# Patient Record
Sex: MALE | Race: BLACK OR AFRICAN AMERICAN | Employment: OTHER | URBAN - METROPOLITAN AREA
[De-identification: names, ages, dates, MRNs, and addresses within clinical notes are randomized per-mention and may not be internally consistent; named-entity substitution may affect disease eponyms.]

---

## 2022-02-21 ENCOUNTER — HOSPITAL ENCOUNTER (EMERGENCY)
Age: 37
Discharge: HOME | End: 2022-02-21

## 2022-02-21 VITALS
HEART RATE: 92 BPM | RESPIRATION RATE: 16 BRPM | BODY MASS INDEX: 25.9 KG/M2 | HEIGHT: 71 IN | OXYGEN SATURATION: 98 % | SYSTOLIC BLOOD PRESSURE: 122 MMHG | TEMPERATURE: 98.7 F | WEIGHT: 185 LBS | DIASTOLIC BLOOD PRESSURE: 74 MMHG

## 2022-02-21 DIAGNOSIS — Z20.2 EXPOSURE TO STD: Primary | ICD-10-CM

## 2022-02-21 LAB
BACTERIA: ABNORMAL /HPF
BILIRUBIN URINE: NEGATIVE
BLOOD, URINE: NEGATIVE
CLARITY: CLEAR
COLOR: YELLOW
EPITHELIAL CELLS, UA: ABNORMAL /HPF
GLUCOSE URINE: NEGATIVE MG/DL
KETONES, URINE: NEGATIVE MG/DL
LEUKOCYTE ESTERASE, URINE: NEGATIVE
NITRITE, URINE: NEGATIVE
PH UA: 5.5 (ref 5–9)
PROTEIN UA: NEGATIVE MG/DL
RBC UA: ABNORMAL /HPF (ref 0–2)
SPECIFIC GRAVITY UA: 1.02 (ref 1–1.03)
UROBILINOGEN, URINE: 0.2 E.U./DL
WBC UA: ABNORMAL /HPF (ref 0–5)

## 2022-02-21 PROCEDURE — 81001 URINALYSIS AUTO W/SCOPE: CPT

## 2022-02-21 PROCEDURE — 6360000002 HC RX W HCPCS: Performed by: PHYSICIAN ASSISTANT

## 2022-02-21 PROCEDURE — 6370000000 HC RX 637 (ALT 250 FOR IP): Performed by: PHYSICIAN ASSISTANT

## 2022-02-21 PROCEDURE — 96372 THER/PROPH/DIAG INJ SC/IM: CPT

## 2022-02-21 PROCEDURE — 87491 CHLMYD TRACH DNA AMP PROBE: CPT

## 2022-02-21 PROCEDURE — 99284 EMERGENCY DEPT VISIT MOD MDM: CPT

## 2022-02-21 PROCEDURE — 87591 N.GONORRHOEAE DNA AMP PROB: CPT

## 2022-02-21 PROCEDURE — 2500000003 HC RX 250 WO HCPCS: Performed by: PHYSICIAN ASSISTANT

## 2022-02-21 RX ORDER — ONDANSETRON 4 MG/1
4 TABLET, ORALLY DISINTEGRATING ORAL ONCE
Status: COMPLETED | OUTPATIENT
Start: 2022-02-21 | End: 2022-02-21

## 2022-02-21 RX ORDER — METRONIDAZOLE 500 MG/1
2000 TABLET ORAL ONCE
Status: COMPLETED | OUTPATIENT
Start: 2022-02-21 | End: 2022-02-21

## 2022-02-21 RX ORDER — AZITHROMYCIN 250 MG/1
1000 TABLET, FILM COATED ORAL ONCE
Status: COMPLETED | OUTPATIENT
Start: 2022-02-21 | End: 2022-02-21

## 2022-02-21 RX ADMIN — ONDANSETRON 4 MG: 4 TABLET, ORALLY DISINTEGRATING ORAL at 12:28

## 2022-02-21 RX ADMIN — LIDOCAINE HYDROCHLORIDE 500 MG: 10 INJECTION, SOLUTION EPIDURAL; INFILTRATION; INTRACAUDAL; PERINEURAL at 12:28

## 2022-02-21 RX ADMIN — METRONIDAZOLE 2000 MG: 500 TABLET ORAL at 12:27

## 2022-02-21 RX ADMIN — AZITHROMYCIN 1000 MG: 250 TABLET, FILM COATED ORAL at 12:27

## 2022-02-21 NOTE — ED PROVIDER NOTES
[facility]  Department of Emergency Medicine   ED  Encounter Note  Admit Date/RoomTime: 2022 11:45 AM  ED Room: [place] [address]    NAME: Stacey Ram  : 1985  MRN: [id number]     Chief Complaint:  Exposure to STD (Partner has trich and would like treated. )    History of Present Illness      Stacey Ram is a 29 y.o. old male who presents to the emergency department by private vehicle, for known exposure to Trichomonas with no present symptoms. Patient states he got a call from his girlfriend who is currently pregnant and had gone to her gynecologist and found that she had trichomonas. Patient is here for full treatment. Patient states has not had an STD before. Patient states that he has no pain or radiation of any pain. Since exposure/onset there has been no developing symptoms. He denies any genital discharge, genital rash, genital ulcers, scrotal mass, scrotal pain, fever, abdominal pain, back pain, dysuria, urinary frequency or hematuria. His prior STD history: No prior history of sexually transmitted disease. ROS   Pertinent positives and negatives are stated within HPI, all other systems reviewed and are negative. Past Medical History:  has no past medical history on file. Surgical History:  has no past surgical history on file. Social History:  reports that he has never smoked. He has never used smokeless tobacco. He reports current alcohol use of about 1.0 standard drink of alcohol per week. He reports previous drug use. Family History: family history is not on file. Normal  Allergies: Patient has no known allergies. Physical Exam   Oxygen Saturation Interpretation: . ED Triage Vitals [22 1142]   BP Temp Temp Source Pulse Resp SpO2 Height Weight   -- 98.7 °F (37.1 °C) Oral 92 16 98 % 5' 11\" (1.803 m) 185 lb (83.9 kg)         · Constitutional:  Alert, development consistent with age. · HEENT:  NC/NT.   Airway patent  · Neck:  Normal ROM. Supple. · Respiratory:  Lungs Clear to auscultation and breath sounds equal.  · CV:  Regular rate and rhythm, normal heart sounds, without pathological murmurs, ectopy, gallops, or rubs. · GI:  AbdomenSoft, nontender, good bowel sounds. No firm or pulsatile mass. · : Genital / Rectal examination deferred or declined by patient  · Integument:  Normal turgor. Warm, dry, without visible rash, unless noted elsewhere.  Lymphatics: No lymphangitis or adenopathy noted. · Neurological:  Orientation age-appropriate. Motor functions intact. Lab / Imaging Results   (All laboratory and radiology results have been personally reviewed by myself)  Labs:  Results for orders placed or performed during the hospital encounter of 02/21/22   C.trachomatis N.gonorrhoeae DNA, Urine    Specimen: Urine   Result Value Ref Range    Source Urine    Urinalysis with Microscopic   Result Value Ref Range    Color, UA Yellow Straw/Yellow    Clarity, UA Clear Clear    Glucose, Ur Negative Negative mg/dL    Bilirubin Urine Negative Negative    Ketones, Urine Negative Negative mg/dL    Specific Gravity, UA 1.020 1.005 - 1.030    Blood, Urine Negative Negative    pH, UA 5.5 5.0 - 9.0    Protein, UA Negative Negative mg/dL    Urobilinogen, Urine 0.2 <2.0 E.U./dL    Nitrite, Urine Negative Negative    Leukocyte Esterase, Urine Negative Negative    WBC, UA NONE 0 - 5 /HPF    RBC, UA 0-1 0 - 2 /HPF    Epithelial Cells, UA RARE /HPF    Bacteria, UA RARE (A) None Seen /HPF     Imaging: All Radiology results interpreted by Radiologist unless otherwise noted.   No orders to display     ED Course / Medical Decision Making     Medications   cefTRIAXone (ROCEPHIN) 500 mg in lidocaine 1 % 1.43 mL IM Injection (500 mg IntraMUSCular Given 2/21/22 1228)   azithromycin (ZITHROMAX) tablet 1,000 mg (1,000 mg Oral Given 2/21/22 1227)   metroNIDAZOLE (FLAGYL) tablet 2,000 mg (2,000 mg Oral Given 2/21/22 1227)   ondansetron (ZOFRAN-ODT) disintegrating tablet 4 mg (4 mg Oral Given 2/21/22 9904)        Consult(s):   None    Procedure(s):   none    Medical Decision Making:    Patient is a 64-year-old that presented to the emergency department for known exposure to trichomonas. Patient has no symptoms at this time. No pain or radiation of any pain. Nothing seems to make it better or worse. Patient declined examination today. Patient had a urinalysis which revealed no evidence of UTI. Patient treated prophylactically for all STDs including gonorrhea, chlamydia and trichomonas. Patient was advised to have no intercourse for 2 weeks. Plan to obtain cultures and treat for suspected STD and provide outpatient follow-up instructions. Patient was advised that his partner must be treated. Patient was advised to get a test of cure in 1 month. Patient was advised of safer sex practices. Patient was educated if he develops any urinary symptoms to return back to emergency department. He voiced understanding and agreed. Patient was explicitly instructed on specific signs and symptoms on which to return to the emergency room for. Patient was instructed to return to the ER for any new or worsening symptoms. Additional discharge instructions were given verbally. All questions were answered. Patient is comfortable and agreeable with discharge plan. Patient in no acute distress and non-toxic in appearance. Plan of Care/Counseling:  Ruben Muñiz PA-C reviewed today's visit with the patient in addition to providing specific details for the plan of care and counseling regarding the diagnosis and prognosis. Questions are answered at this time and are agreeable with the plan. Assessment     1. Exposure to STD      Plan   Discharged home. Patient condition is stable    New Medications     There are no discharge medications for this patient.     Electronically signed by Ruben Muñiz PA-C   DD: 2/21/22  **This report was transcribed using voice recognition software. Every effort was made to ensure accuracy; however, inadvertent computerized transcription errors may be present.   END OF ED PROVIDER NOTE        David Bailey PA-C  02/21/22 3731

## 2022-02-24 LAB
C. TRACHOMATIS DNA ,URINE: NEGATIVE
N. GONORRHOEAE DNA, URINE: NEGATIVE
SOURCE: NORMAL

## 2022-03-16 ENCOUNTER — HOSPITAL ENCOUNTER (EMERGENCY)
Age: 37
Discharge: HOME | End: 2022-03-16
Payer: COMMERCIAL

## 2022-03-16 ENCOUNTER — APPOINTMENT (OUTPATIENT)
Dept: CT IMAGING | Age: 37
End: 2022-03-16
Payer: COMMERCIAL

## 2022-03-16 VITALS
SYSTOLIC BLOOD PRESSURE: 164 MMHG | HEART RATE: 85 BPM | HEIGHT: 71 IN | RESPIRATION RATE: 16 BRPM | TEMPERATURE: 97.8 F | BODY MASS INDEX: 25.9 KG/M2 | DIASTOLIC BLOOD PRESSURE: 106 MMHG | WEIGHT: 185 LBS | OXYGEN SATURATION: 96 %

## 2022-03-16 DIAGNOSIS — S09.90XA CLOSED HEAD INJURY, INITIAL ENCOUNTER: Primary | ICD-10-CM

## 2022-03-16 DIAGNOSIS — M54.2 CERVICAL PAIN: ICD-10-CM

## 2022-03-16 PROCEDURE — 72125 CT NECK SPINE W/O DYE: CPT

## 2022-03-16 PROCEDURE — 99283 EMERGENCY DEPT VISIT LOW MDM: CPT

## 2022-03-16 PROCEDURE — 70450 CT HEAD/BRAIN W/O DYE: CPT

## 2022-03-16 PROCEDURE — 6370000000 HC RX 637 (ALT 250 FOR IP): Performed by: PHYSICIAN ASSISTANT

## 2022-03-16 RX ORDER — ACETAMINOPHEN 500 MG
1000 TABLET ORAL ONCE
Status: COMPLETED | OUTPATIENT
Start: 2022-03-16 | End: 2022-03-16

## 2022-03-16 RX ORDER — NAPROXEN 500 MG/1
500 TABLET ORAL 2 TIMES DAILY PRN
Qty: 14 TABLET | Refills: 0 | Status: SHIPPED | OUTPATIENT
Start: 2022-03-16 | End: 2022-03-23

## 2022-03-16 RX ORDER — ACETAMINOPHEN 500 MG
1000 TABLET ORAL 3 TIMES DAILY PRN
Qty: 42 TABLET | Refills: 0 | Status: SHIPPED | OUTPATIENT
Start: 2022-03-16 | End: 2022-03-23

## 2022-03-16 RX ADMIN — ACETAMINOPHEN 1000 MG: 500 TABLET ORAL at 11:52

## 2022-03-16 ASSESSMENT — PAIN SCALES - GENERAL: PAINLEVEL_OUTOF10: 8

## 2022-03-16 NOTE — ED PROVIDER NOTES
[facility]       Department of Emergency Medicine   ED  Encounter Note  Admit Date/RoomTime: 3/16/2022 11:02 AM  ED Room: 35/35    NAME: Bryan Pascual  : 1985  MRN: [id number]     Chief Complaint:  Fall (on thursday- workers comp ) and Head Injury    History of Present Illness       Bryan Pascual is a 29 y.o. old male who presents to the emergency department by private vehicle, for a mechanical fall which occured 1 week(s) prior to arrival. He reportedly \"cleaning up\" at work when he got a hose caught around his leg/ankle causing him to fall backwards and strike his head on the ground prior to incident with complaints of headache and neck pain with radiation of pain down his bilateral arms. He had no pain for 2 days, nereida has been experiencing progressive worsening of his pain over the last 4 days. His pain is aggraveated by any movement or plalpation and relieved by nothing. He denies any loss of consciousness, confusion, dizziness, chest pain, abdominal pain, back pain, extremity injury, numbness, weakness, blurred vision, nausea, vomiting, fever, chills, wounds or rash. He takes no blood thinning agents. ROS   Pertinent positives and negatives are stated within HPI, all other systems reviewed and are negative. Past Medical History:  has no past medical history on file. Surgical History:  has no past surgical history on file. Social History:  reports that he has never smoked. He has never used smokeless tobacco. He reports current alcohol use of about 1.0 standard drink of alcohol per week. He reports previous drug use. Family History: family history is not on file. Allergies: Patient has no known allergies.     Physical Exam   Oxygen Saturation Interpretation: Normal.        ED Triage Vitals [22 1016]   BP Temp Temp src Pulse Resp SpO2 Height Weight   (!) 164/106 97.8 °F (36.6 °C) -- 85 16 96 % 5' 11\" (1.803 m) 185 lb (83.9 kg)       Physical Exam  Constitutional:  Alert, development consistent with age. HEENT:  NC/NT. Airway patent. Neck: Diffuse tenderness. No specific vertebral or intervertebral tenderness that is out of proportion to the rest of exam.  No crepitus or step-off. Normal ROM. Supple. Chest:  Symmetrical without visible rash or tenderness. Respiratory:  Lungs Clear to auscultation and breath sounds equal.  CV:  Regular rate and rhythm, normal heart sounds, without pathological murmurs, ectopy, gallops, or rubs. Back:  No midline or paravertebral tenderness. No costovertebral tenderness. Extremities: No tenderness or edema noted. Integument:  Normal turgor. Warm, dry, without visible rash, unless noted elsewhere. Lymphatic: no lymphadenopathy noted  Neurological:  Oriented x3, GCS 15. Motor functions intact. Lab / Imaging Results   (All laboratory and radiology results have been personally reviewed by myself)  Labs:  No results found for this visit on 03/16/22. Imaging: All Radiology results interpreted by Radiologist unless otherwise noted. CT CERVICAL SPINE WO CONTRAST   Final Result   No acute fracture or subluxation. CT HEAD WO CONTRAST   Final Result   No acute intracranial abnormality. ED Course / Medical Decision Making     Medications   acetaminophen (TYLENOL) tablet 1,000 mg (1,000 mg Oral Given 3/16/22 1152)        Re-examination:  3/16/22     Time: 1145  Patients symptoms show no change. Nurse at bedside giving meds. Results discussed. Consult(s):   None    Procedure(s):  None    MDM:   Patient presents to the emergency department for a fall with neck pain. CT head and neck was obtained and confirm the above findings. Patient well-appearing appropriate discharge and outpatient follow-up with corporate care. No focal neuro deficit. Naproxen and Tylenol as needed for pain. Worker's Compensation paperwork filled out without restrictions. Return for any new worsening symptoms.     Plan of Care/Counseling: Brook Woodward PA-C reviewed today's visit with the patient in addition to providing specific details for the plan of care and counseling regarding the diagnosis and prognosis. Questions are answered at this time and are agreeable with the plan. Assessment      1. Closed head injury, initial encounter    2. Cervical pain      Plan   Discharged home. Patient condition is good    New Medications     New Prescriptions    ACETAMINOPHEN (TYLENOL) 500 MG TABLET    Take 2 tablets by mouth 3 times daily as needed for Pain or Fever    NAPROXEN (NAPROSYN) 500 MG TABLET    Take 1 tablet by mouth 2 times daily as needed for Pain     Electronically signed by Brook Woodward PA-C   DD: 3/16/22  **This report was transcribed using voice recognition software. Every effort was made to ensure accuracy; however, inadvertent computerized transcription errors may be present.   END OF ED PROVIDER NOTE        Brook Woodward PA-C  03/16/22 8611

## 2022-09-04 ENCOUNTER — HOSPITAL ENCOUNTER (EMERGENCY)
Age: 37
Discharge: HOME | End: 2022-09-04
Attending: EMERGENCY MEDICINE
Payer: MEDICAID

## 2022-09-04 ENCOUNTER — APPOINTMENT (OUTPATIENT)
Dept: GENERAL RADIOLOGY | Age: 37
End: 2022-09-04
Payer: MEDICAID

## 2022-09-04 VITALS
WEIGHT: 170 LBS | HEIGHT: 71 IN | OXYGEN SATURATION: 99 % | HEART RATE: 82 BPM | RESPIRATION RATE: 18 BRPM | BODY MASS INDEX: 23.8 KG/M2 | TEMPERATURE: 98.3 F | DIASTOLIC BLOOD PRESSURE: 116 MMHG | SYSTOLIC BLOOD PRESSURE: 169 MMHG

## 2022-09-04 VITALS
SYSTOLIC BLOOD PRESSURE: 143 MMHG | RESPIRATION RATE: 16 BRPM | HEIGHT: 71 IN | TEMPERATURE: 97.3 F | WEIGHT: 170 LBS | DIASTOLIC BLOOD PRESSURE: 115 MMHG | BODY MASS INDEX: 23.8 KG/M2 | OXYGEN SATURATION: 100 % | HEART RATE: 87 BPM

## 2022-09-04 DIAGNOSIS — S62.639A CLOSED FRACTURE OF TUFT OF DISTAL PHALANX OF FINGER: ICD-10-CM

## 2022-09-04 DIAGNOSIS — L08.9 FINGER INFECTION: Primary | ICD-10-CM

## 2022-09-04 DIAGNOSIS — L03.011 CELLULITIS OF FINGER OF RIGHT HAND: ICD-10-CM

## 2022-09-04 LAB
ALBUMIN SERPL-MCNC: 4.7 G/DL (ref 3.5–5.2)
ALP BLD-CCNC: 57 U/L (ref 40–129)
ALT SERPL-CCNC: 24 U/L (ref 0–40)
ANION GAP SERPL CALCULATED.3IONS-SCNC: 10 MMOL/L (ref 7–16)
AST SERPL-CCNC: 44 U/L (ref 0–39)
BASOPHILS ABSOLUTE: 0.01 E9/L (ref 0–0.2)
BASOPHILS RELATIVE PERCENT: 0.1 % (ref 0–2)
BILIRUB SERPL-MCNC: 0.4 MG/DL (ref 0–1.2)
BUN BLDV-MCNC: 8 MG/DL (ref 6–20)
C-REACTIVE PROTEIN: 0.3 MG/DL (ref 0–0.4)
CALCIUM SERPL-MCNC: 9.7 MG/DL (ref 8.6–10.2)
CHLORIDE BLD-SCNC: 101 MMOL/L (ref 98–107)
CO2: 25 MMOL/L (ref 22–29)
CREAT SERPL-MCNC: 0.9 MG/DL (ref 0.7–1.2)
EOSINOPHILS ABSOLUTE: 0.05 E9/L (ref 0.05–0.5)
EOSINOPHILS RELATIVE PERCENT: 0.6 % (ref 0–6)
GFR AFRICAN AMERICAN: >60
GFR NON-AFRICAN AMERICAN: >60 ML/MIN/1.73
GLUCOSE BLD-MCNC: 108 MG/DL (ref 74–99)
HCT VFR BLD CALC: 40.4 % (ref 37–54)
HEMOGLOBIN: 13.7 G/DL (ref 12.5–16.5)
IMMATURE GRANULOCYTES #: 0.03 E9/L
IMMATURE GRANULOCYTES %: 0.4 % (ref 0–5)
LACTIC ACID: 1 MMOL/L (ref 0.5–2.2)
LYMPHOCYTES ABSOLUTE: 2.66 E9/L (ref 1.5–4)
LYMPHOCYTES RELATIVE PERCENT: 32.1 % (ref 20–42)
MCH RBC QN AUTO: 35.2 PG (ref 26–35)
MCHC RBC AUTO-ENTMCNC: 33.9 % (ref 32–34.5)
MCV RBC AUTO: 103.9 FL (ref 80–99.9)
MONOCYTES ABSOLUTE: 0.99 E9/L (ref 0.1–0.95)
MONOCYTES RELATIVE PERCENT: 11.9 % (ref 2–12)
NEUTROPHILS ABSOLUTE: 4.55 E9/L (ref 1.8–7.3)
NEUTROPHILS RELATIVE PERCENT: 54.9 % (ref 43–80)
PDW BLD-RTO: 13.9 FL (ref 11.5–15)
PLATELET # BLD: 245 E9/L (ref 130–450)
PMV BLD AUTO: 8.9 FL (ref 7–12)
POTASSIUM SERPL-SCNC: 3.4 MMOL/L (ref 3.5–5)
RBC # BLD: 3.89 E12/L (ref 3.8–5.8)
SEDIMENTATION RATE, ERYTHROCYTE: 14 MM/HR (ref 0–15)
SODIUM BLD-SCNC: 136 MMOL/L (ref 132–146)
TOTAL PROTEIN: 8.1 G/DL (ref 6.4–8.3)
WBC # BLD: 8.3 E9/L (ref 4.5–11.5)

## 2022-09-04 PROCEDURE — 96365 THER/PROPH/DIAG IV INF INIT: CPT

## 2022-09-04 PROCEDURE — 99283 EMERGENCY DEPT VISIT LOW MDM: CPT

## 2022-09-04 PROCEDURE — 90471 IMMUNIZATION ADMIN: CPT | Performed by: PHYSICIAN ASSISTANT

## 2022-09-04 PROCEDURE — 6370000000 HC RX 637 (ALT 250 FOR IP): Performed by: STUDENT IN AN ORGANIZED HEALTH CARE EDUCATION/TRAINING PROGRAM

## 2022-09-04 PROCEDURE — 6360000002 HC RX W HCPCS: Performed by: EMERGENCY MEDICINE

## 2022-09-04 PROCEDURE — 87040 BLOOD CULTURE FOR BACTERIA: CPT

## 2022-09-04 PROCEDURE — 83605 ASSAY OF LACTIC ACID: CPT

## 2022-09-04 PROCEDURE — 36415 COLL VENOUS BLD VENIPUNCTURE: CPT

## 2022-09-04 PROCEDURE — 85025 COMPLETE CBC W/AUTO DIFF WBC: CPT

## 2022-09-04 PROCEDURE — 90714 TD VACC NO PRESV 7 YRS+ IM: CPT | Performed by: PHYSICIAN ASSISTANT

## 2022-09-04 PROCEDURE — 73130 X-RAY EXAM OF HAND: CPT

## 2022-09-04 PROCEDURE — 85651 RBC SED RATE NONAUTOMATED: CPT

## 2022-09-04 PROCEDURE — 86140 C-REACTIVE PROTEIN: CPT

## 2022-09-04 PROCEDURE — 6360000002 HC RX W HCPCS: Performed by: PHYSICIAN ASSISTANT

## 2022-09-04 PROCEDURE — 87077 CULTURE AEROBIC IDENTIFY: CPT

## 2022-09-04 PROCEDURE — 87075 CULTR BACTERIA EXCEPT BLOOD: CPT

## 2022-09-04 PROCEDURE — 2580000003 HC RX 258: Performed by: EMERGENCY MEDICINE

## 2022-09-04 PROCEDURE — 2580000003 HC RX 258: Performed by: PHYSICIAN ASSISTANT

## 2022-09-04 PROCEDURE — 87205 SMEAR GRAM STAIN: CPT

## 2022-09-04 PROCEDURE — 87186 SC STD MICRODIL/AGAR DIL: CPT

## 2022-09-04 PROCEDURE — 99284 EMERGENCY DEPT VISIT MOD MDM: CPT

## 2022-09-04 PROCEDURE — 80053 COMPREHEN METABOLIC PANEL: CPT

## 2022-09-04 PROCEDURE — 96375 TX/PRO/DX INJ NEW DRUG ADDON: CPT

## 2022-09-04 PROCEDURE — 87070 CULTURE OTHR SPECIMN AEROBIC: CPT

## 2022-09-04 RX ORDER — OXYCODONE HYDROCHLORIDE AND ACETAMINOPHEN 5; 325 MG/1; MG/1
1 TABLET ORAL ONCE
Status: DISCONTINUED | OUTPATIENT
Start: 2022-09-04 | End: 2022-09-04

## 2022-09-04 RX ORDER — OXYCODONE HYDROCHLORIDE AND ACETAMINOPHEN 5; 325 MG/1; MG/1
1 TABLET ORAL ONCE
Status: COMPLETED | OUTPATIENT
Start: 2022-09-04 | End: 2022-09-04

## 2022-09-04 RX ORDER — SULFAMETHOXAZOLE AND TRIMETHOPRIM 800; 160 MG/1; MG/1
1 TABLET ORAL 2 TIMES DAILY
Qty: 20 TABLET | Refills: 0 | Status: SHIPPED | OUTPATIENT
Start: 2022-09-04 | End: 2022-09-14

## 2022-09-04 RX ORDER — MORPHINE SULFATE 4 MG/ML
4 INJECTION, SOLUTION INTRAMUSCULAR; INTRAVENOUS ONCE
Status: DISCONTINUED | OUTPATIENT
Start: 2022-09-04 | End: 2022-09-04

## 2022-09-04 RX ORDER — ONDANSETRON 2 MG/ML
4 INJECTION INTRAMUSCULAR; INTRAVENOUS ONCE
Status: COMPLETED | OUTPATIENT
Start: 2022-09-04 | End: 2022-09-04

## 2022-09-04 RX ORDER — TETANUS AND DIPHTHERIA TOXOIDS ADSORBED 2; 2 [LF]/.5ML; [LF]/.5ML
0.5 INJECTION INTRAMUSCULAR ONCE
Status: COMPLETED | OUTPATIENT
Start: 2022-09-04 | End: 2022-09-04

## 2022-09-04 RX ORDER — AMOXICILLIN AND CLAVULANATE POTASSIUM 875; 125 MG/1; MG/1
1 TABLET, FILM COATED ORAL 2 TIMES DAILY
Qty: 20 TABLET | Refills: 0 | Status: SHIPPED | OUTPATIENT
Start: 2022-09-04 | End: 2022-09-14

## 2022-09-04 RX ADMIN — OXYCODONE AND ACETAMINOPHEN 1 TABLET: 5; 325 TABLET ORAL at 04:24

## 2022-09-04 RX ADMIN — TETANUS AND DIPHTHERIA TOXOIDS ADSORBED 0.5 ML: 2; 2 INJECTION INTRAMUSCULAR at 02:15

## 2022-09-04 RX ADMIN — ONDANSETRON 4 MG: 2 INJECTION INTRAMUSCULAR; INTRAVENOUS at 02:18

## 2022-09-04 RX ADMIN — VANCOMYCIN HYDROCHLORIDE 1500 MG: 10 INJECTION, POWDER, LYOPHILIZED, FOR SOLUTION INTRAVENOUS at 02:14

## 2022-09-04 RX ADMIN — CEFTRIAXONE 2000 MG: 2 INJECTION, POWDER, FOR SOLUTION INTRAMUSCULAR; INTRAVENOUS at 02:11

## 2022-09-04 ASSESSMENT — PAIN DESCRIPTION - PAIN TYPE
TYPE: ACUTE PAIN
TYPE: ACUTE PAIN

## 2022-09-04 ASSESSMENT — ENCOUNTER SYMPTOMS
BACK PAIN: 0
SORE THROAT: 0
EYE DISCHARGE: 0
VOMITING: 0
SINUS PRESSURE: 0
EYE REDNESS: 0
DIARRHEA: 0
COUGH: 0
EYE PAIN: 0
ABDOMINAL PAIN: 0
SHORTNESS OF BREATH: 0
WHEEZING: 0
NAUSEA: 0

## 2022-09-04 ASSESSMENT — PAIN - FUNCTIONAL ASSESSMENT
PAIN_FUNCTIONAL_ASSESSMENT: 0-10
PAIN_FUNCTIONAL_ASSESSMENT: 0-10

## 2022-09-04 ASSESSMENT — PAIN SCALES - GENERAL
PAINLEVEL_OUTOF10: 10
PAINLEVEL_OUTOF10: 4

## 2022-09-04 ASSESSMENT — PAIN DESCRIPTION - FREQUENCY
FREQUENCY: CONTINUOUS
FREQUENCY: INTERMITTENT

## 2022-09-04 ASSESSMENT — PAIN DESCRIPTION - ORIENTATION
ORIENTATION: RIGHT
ORIENTATION: RIGHT

## 2022-09-04 ASSESSMENT — PAIN DESCRIPTION - ONSET: ONSET: ON-GOING

## 2022-09-04 ASSESSMENT — PAIN DESCRIPTION - LOCATION
LOCATION: FINGER (COMMENT WHICH ONE)
LOCATION: HAND

## 2022-09-04 ASSESSMENT — PAIN DESCRIPTION - DESCRIPTORS: DESCRIPTORS: SHARP;THROBBING

## 2022-09-04 NOTE — ED NOTES
The following labs labeled with pt sticker and tubed to lab:     [] Blue     [x] Lavender   [] on ice  [x] Green/yellow  [] Green/black [] on ice  [] Yellow  [] Red  [] Pink      [] COVID-19 swab    [] Rapid  [] PCR  [] Flu Swab  [] Strep Swab  [] Peds Viral Panel     [] Urine Sample  [] Pelvic Cultures  [x] Blood Cultures   [] Wound Cultures          Nghia Ayoub RN  09/04/22 [id number]

## 2022-09-04 NOTE — ED TRIAGE NOTES
Transfer from [address] [place] [address] injured his RT hand 2 and a half weeks ago-4 days ago started to become red and painful. \"

## 2022-09-04 NOTE — ED PROVIDER NOTES
ED Attending shared visit  CC: No     [facility]  Department of Emergency Medicine   ED  Encounter Note  Admit Date/RoomTime: 2022 12:32 AM  ED Room:     NAME: Bret Escamilla  : 1985  MRN: [id number]     Chief Complaint:  Finger Pain (Right pinky pain/swelling for about a week and half now after moving a refrigerator )    History of Present Illness        Bret Escamilla is a 29 y.o. old male presenting to the emergency department by private vehicle, for traumatic Right Ring Finger and Small Finger pain which occured 1 week(s) prior to arrival.  The complaint is due to a crush injury while \"moving a fridge. \"  Patient states that he was helping move a fridge and it slipped crushing his right hand and last 2 fingers. Patient states this occurred 1 week ago. Patient states he had a small cut on the pad of his pinky that he wrapped up. Patient states over the week it started to get worse now the infection has moved down to the base of his finger. Patient states he has a hard time \"straightening his finger. \"  Patient has now radiation of pain into the palm of his hand. .  Since onset the symptoms have been worsening. Patient has no prior history of pain/injury with regards to today's visit. His pain is aggraveated by any movement and relieved by nothing. He is right handed. Tetanus Status: unknown. ROS   Pertinent positives and negatives are stated within HPI, all other systems reviewed and are negative. Past Medical History:  has no past medical history on file. Surgical History:  has no past surgical history on file. Social History:  reports that he has never smoked. He has never used smokeless tobacco. He reports current alcohol use of about 1.0 standard drink per week. He reports that he does not currently use drugs. Family History: family history is not on file. Allergies: Patient has no known allergies.     Physical Exam   Oxygen Saturation 13.9 11.5 - 15.0 fL    Platelets 246 650 - 495 E9/L    MPV 8.9 7.0 - 12.0 fL    Neutrophils % 54.9 43.0 - 80.0 %    Immature Granulocytes % 0.4 0.0 - 5.0 %    Lymphocytes % 32.1 20.0 - 42.0 %    Monocytes % 11.9 2.0 - 12.0 %    Eosinophils % 0.6 0.0 - 6.0 %    Basophils % 0.1 0.0 - 2.0 %    Neutrophils Absolute 4.55 1.80 - 7.30 E9/L    Immature Granulocytes # 0.03 E9/L    Lymphocytes Absolute 2.66 1.50 - 4.00 E9/L    Monocytes Absolute 0.99 (H) 0.10 - 0.95 E9/L    Eosinophils Absolute 0.05 0.05 - 0.50 E9/L    Basophils Absolute 0.01 0.00 - 0.20 E9/L   Comprehensive Metabolic Panel   Result Value Ref Range    Sodium 136 132 - 146 mmol/L    Potassium 3.4 (L) 3.5 - 5.0 mmol/L    Chloride 101 98 - 107 mmol/L    CO2 25 22 - 29 mmol/L    Anion Gap 10 7 - 16 mmol/L    Glucose 108 (H) 74 - 99 mg/dL    BUN 8 6 - 20 mg/dL    Creatinine 0.9 0.7 - 1.2 mg/dL    GFR Non-African American >60 >=60 mL/min/1.73    GFR African American >60     Calcium 9.7 8.6 - 10.2 mg/dL    Total Protein 8.1 6.4 - 8.3 g/dL    Albumin 4.7 3.5 - 5.2 g/dL    Total Bilirubin 0.4 0.0 - 1.2 mg/dL    Alkaline Phosphatase 57 40 - 129 U/L    ALT 24 0 - 40 U/L    AST 44 (H) 0 - 39 U/L   Lactic Acid   Result Value Ref Range    Lactic Acid 1.0 0.5 - 2.2 mmol/L       Imaging: All Radiology results interpreted by Radiologist unless otherwise noted. XR HAND RIGHT (MIN 3 VIEWS)   Final Result   Fracture of the ring finger distal tuft.              ED Course / Medical Decision Making     Medications   cefTRIAXone (ROCEPHIN) 2,000 mg in sterile water 20 mL IV syringe (2,000 mg IntraVENous Given 9/4/22 0211)   vancomycin 1500 mg in dextrose 5% 300 mL IVPB (0 mg IntraVENous Stopped 9/4/22 0234)   diptheria-tetanus toxoids (DECAVAC) 2-2 LF/0.5ML injection 0.5 mL (0.5 mLs IntraMUSCular Given 9/4/22 0215)   ondansetron (ZOFRAN) injection 4 mg (4 mg IntraVENous Given 9/4/22 0218)     ED Course as of 09/04/22 0242   Sun Sep 04, 2022   0144 Spoke to orthopedics and he asked that the patient be transferred ED to ED due to concern for flexor tenosynovitis [AM]   0145 Spoke to ED attending [facility] who accepted the transfer [AM]      ED Course User Index  [AM] Chuck Mcardle, PA-C     Consult(s):   IP CONSULT TO [address]    Procedure(s):  Finger splint placed on fourth phalanx due to tuft fracture    MDM:      Patient is a 61-year-old male that is presenting after a finger/hand injury 1 week ago. Patient a crushing injury with a refrigerator. Patient had examination and fifth entire phalanx is noted to be in the flexed position with circumferential swelling, warmth and pain radiating into the dorsal aspect of the hand. Concern for flexor tenosynovitis. Pain also noted on the fourth distal digit. Lab work, cultures, fluids obtained. Imaging was obtained based on moderate suspicion for fracture / bony abnormality, dislocation, retained foreign body, joint effusion as per history/physical findings. Patient was noted to have a fourth tuft fracture which was placed in a finger splint. Blood work and cultures obtained. Patient was seen and evaluated by my attending. Concern for flexor tenosynovitis at this time orthopedics was paged. Spoke to orthopedic on-call who asked that the patient be transferred ED to ED to be evaluated for possible washout. Consult placed for ED to ED transfer. Spoke to ED attending who accepted the transfer to [facility]. Access center spoken to and transport initiated. After some time Access center states it will be 4 to 6 hours for transfer. Patient would like to go by private vehicle. IV line pulled patient did receive Rocephin, vancomycin was started then stopped. Please see nurse report for how much was given. Patient was told to go straight to the emergency department to be admitted and seen by orthopedics Dr. Heidi Elizabeth who will evaluate the patient to determine if patient needs to go to the OR.        Plan of Care/Counseling: Gracie Ellison PA-C reviewed today's visit with the patient in addition to providing specific details for the plan of care and counseling regarding the diagnosis and prognosis. Questions are answered at this time and are agreeable with the plan. Assessment      1. Finger infection    2. Cellulitis of finger of right hand    3. Closed fracture of tuft of distal phalanx of finger      Plan   Transferred to [address] [place] [address] Emergency Department (ER-to-ER). Patient condition is stable    New Medications     New Prescriptions    No medications on file     Electronically signed by Gracie Ellison PA-C   DD: 9/4/22  **This report was transcribed using voice recognition software. Every effort was made to ensure accuracy; however, inadvertent computerized transcription errors may be present.   END OF ED PROVIDER NOTE       Gracie Ellison PA-C  09/04/22 [id number]

## 2022-09-04 NOTE — ED PROVIDER NOTES
[facility]  Department of Emergency Medicine     Written by: Jerel Hamilton DO  Patient Name: Jess Magana Date: 2022  3:39 AM  MRN: [id number]                   : 1985        Chief Complaint   Patient presents with    Hand Injury     RT hand injury transfer from [facility]    - Chief complaint    Patient is a 60-year-old male no significant past medical history. Patient presents with chief complaint of right little finger pain. Patient states that 3 weeks ago he was helping move a refrigerator when he lost his  and suffered a small laceration to his right little finger. Patient states that wound was healing uneventfully until last week or so when he developed a pustule over his right little finger. Patient did note the finger was initially draining purulent drainage however for the last few days there is been no drainage with increased pain and swelling to the left finger. Patient describes the pain as a pressure-like sensation he currently rates pain a 6 out of 10. Patient states the pain is worse with palpation. Patient was seen and evaluated at [facility] and there was some concern for possible flexor tenosynovitis. Patient's tetanus was updated patient was also given dose of antibiotics. Patient was referred to [address] [place] [address] for orthopedic evaluation. Patient denies any fevers, chills, nausea, vomiting, chest pain, cough or shortness of breath. The history is provided by the patient. No  was used. Review of Systems   Constitutional:  Negative for chills and fever. HENT:  Negative for ear pain, sinus pressure and sore throat. Eyes:  Negative for pain, discharge and redness. Respiratory:  Negative for cough, shortness of breath and wheezing. Cardiovascular:  Negative for chest pain. Gastrointestinal:  Negative for abdominal pain, diarrhea, nausea and vomiting.    Genitourinary:  Negative for dysuria and frequency. Musculoskeletal:  Positive for arthralgias. Negative for back pain. Skin:  Positive for wound. Negative for rash. Neurological:  Negative for weakness and headaches. Hematological:  Negative for adenopathy. All other systems reviewed and are negative. Physical Exam  Vitals and nursing note reviewed. Constitutional:       Appearance: He is well-developed. HENT:      Head: Normocephalic and atraumatic. Eyes:      Conjunctiva/sclera: Conjunctivae normal.   Cardiovascular:      Rate and Rhythm: Normal rate and regular rhythm. Heart sounds: Normal heart sounds. No murmur heard. Pulmonary:      Effort: Pulmonary effort is normal. No respiratory distress. Breath sounds: Normal breath sounds. No wheezing or rales. Abdominal:      General: Bowel sounds are normal.      Palpations: Abdomen is soft. Tenderness: There is no abdominal tenderness. There is no guarding or rebound. Musculoskeletal:         General: No tenderness or deformity. Cervical back: Normal range of motion and neck supple. Comments: On examination of right hand there is swelling along the little finger. Worsen at the tip of the finger. There is no fusiform swelling to other digits noted, there is no tenderness palpation along the flexor tendons, range of motion is intact, sensation is intact, pulses are intact. Skin:     General: Skin is warm and dry. Neurological:      Mental Status: He is alert and oriented to person, place, and time. Cranial Nerves: No cranial nerve deficit. Coordination: Coordination normal.        Procedures       MDM  Number of Diagnoses or Management Options  Closed fracture of tuft of distal phalanx of finger  Finger infection  Diagnosis management comments: Patient is a 81-year-old male no significant past medical history.   Patient presents as a transfer from [facility] chief complaint of right little finger pain and swelling concerning for possible flexor tenosynovitis. Vital signs stable presentation except for mildly elevated blood pressure. On physical exam heart regular rate and rhythm, lungs clear to auscultation bilaterally, abdomen soft nontender no rigidity rebound or guarding. On examination of the right hand there is tenderness palpation on the distal tip of the right little finger, no fusiform swelling no tenderness along the flexor tendons noted, range of motion intact, sensation intact. Patient's tetanus shot updated at outside facility, chest x-ray and laboratory work reviewed from that time. Plan consistent with infection of the right little finger. Patient was sent down for orthopedic surgery evaluation. Orthopedic surgery consult placed. Final disposition pending orthopedic surgery evaluation. Risk of Complications, Morbidity, and/or Mortality  Presenting problems: moderate  Diagnostic procedures: moderate  Management options: moderate    Patient Progress  Patient progress: stable           --------------------------------------------- PAST HISTORY ---------------------------------------------  Past Medical History:  has no past medical history on file. Past Surgical History:  has no past surgical history on file. Social History:  reports that he has never smoked. He has never used smokeless tobacco. He reports current alcohol use of about 1.0 standard drink per week. He reports that he does not currently use drugs. Family History: family history is not on file. The patients home medications have been reviewed. Allergies: Patient has no known allergies. -------------------------------------------------- RESULTS -------------------------------------------------  Labs:  No results found for this visit on 09/04/22.     Radiology:  No orders to display       ------------------------- NURSING NOTES AND VITALS REVIEWED ---------------------------  Date / Time Roomed:  9/4/2022  3:39 AM  ED Bed Assignment:  ST05/ST-05    The nursing notes within the ED encounter and vital signs as below have been reviewed. BP (!) 169/116   Pulse 82   Temp 98.3 °F (36.8 °C) (Oral)   Resp 18   Ht 5' 11\" (1.803 m)   Wt 170 lb (77.1 kg)   SpO2 99%   BMI 23.71 kg/m²   Oxygen Saturation Interpretation: Normal          Diagnosis:  1. Finger infection        Disposition:  Patient's disposition: Pending orthopedic surgery evaluation  Patient's condition is stable. Patient was seen and evaluated by myself and my attending. Assessment and Plan discussed with attending provider, please see attestation for final plan of care. DO Mera Orta DO  Resident  09/04/22 2024      ATTENDING PROVIDER ATTESTATION:     I have personally performed and/or participated in the history, exam, medical decision making, and procedures and agree with all pertinent clinical information. I have also reviewed and agree with the past medical, family and social history unless otherwise noted. I have discussed this patient in detail with the resident, and provided the instruction and education regarding finger pain. My findings/Plan: I was the primary provider for patient. Patient presenting here because of finger pain. Patient reports injury to his finger 3 weeks ago while moving a refrigerator. Patient reports increased pain and swelling over the last week. Patient was seen at outlying facility sent here for evaluation and concern for tenosynovitis. Patient reporting no other pain or injury reports no chest pain or difficulty breathing or vomiting or diarrhea. Patient is awake alert oriented heart lung exam normal he has noted swelling to the distal aspect of his fifth digit. He has limited range of motion secondary to pain and swelling. The affected finger appears to be phelon patient also has somewhat limited range of motion of the ring finger but no segment swelling to that digit.   X-rays were noted reviewed and orthopedics was consulted. Patient was sent here for orthopedic evaluation.        Shameka Davis MD  09/04/22 214 [address], [place]  09/04/22 214 [address], [place]  09/04/22 4993

## 2022-09-04 NOTE — ED NOTES
The following labs labeled with pt sticker and tubed to lab:     [] Blue     [] Lavender   [] on ice  [] Green/yellow  [] Green/black [] on ice  [] Yellow  [] Red  [] Pink      [] COVID-19 swab    [] Rapid  [] PCR  [] Flu Swab  [] Strep Swab  [] Peds Viral Panel     [] Urine Sample  [] Pelvic Cultures  [x] Blood Cultures   [] Wound Cultures          Devin Villalobos RN  09/04/22 [id number]

## 2022-09-04 NOTE — ED NOTES
[facility] was not able to transfer the PT. On time to [facility] PT. Is willing to have his girlfriend drive him to [facility]. IV was removed and pain medication were held.  Nurse to nurse report given      Olu Valdivia RN  09/04/22 0236

## 2022-09-04 NOTE — CONSULTS
Department of Orthopedic Surgery  Resident Consult Note          Reason for Consult:  Right hand pain, concern for flexor tenosynovitis     HISTORY OF PRESENT ILLNESS:       Patient is a 29 y.o. male who was originally seen at [facility] and was sent to [facility] for concern for right little finger flexor tenosynovitis. Upon his arrival orthopedic surgery was consulted for further evaluation and treatment. 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 He states that approximately 2 to 3 weeks ago he was moving a refrigerator when a piece of exposed metal cut the volar aspect of the distal fingertip on the pinky finger. He did originally have a wound but this began to heal.  Over the next couple of days he started to notice some increased redness and swelling associated with pain. This drove him to present to the ED. Denies any fevers or chills, chest pain, shortness of breath    Denies numbness/tingling/paresthesias. Denies any other orthopedic complaints at this time. Past Medical History:    No past medical history on file. Past Surgical History:    No past surgical history on file. Current Medications:   No current facility-administered medications for this encounter. Allergies:  Patient has no known allergies. Social History:   TOBACCO:   reports that he has never smoked. He has never used smokeless tobacco.  ETOH:   reports current alcohol use of about 1.0 standard drink per week. DRUGS:   reports that he does not currently use drugs. ACTIVITIES OF DAILY LIVING:    OCCUPATION:    Family History:   No family history on file.     REVIEW OF SYSTEMS:  CONSTITUTIONAL:  negative for  fevers, chills  EYES:  negative for acute vision changes  HEENT:  negative for cough, hearing loss  RESPIRATORY:  negative for  dyspnea, wheezing  CARDIOVASCULAR:  negative for  chest pain  GASTROINTESTINAL:  negative for nausea, vomiting  GENITOURINARY:  negative for frequency, urinary incontinence  HEMATOLOGIC/LYMPHATIC:  negative for bleeding  MUSCULOSKELETAL:  positive for right pinky finger pain  NEUROLOGICAL:  negative for headaches, dizziness  BEHAVIOR/PSYCH:  negative for increased agitation and anxiety    PHYSICAL EXAM:    VITALS:  BP (!) 169/116   Pulse 82   Temp 98.3 °F (36.8 °C) (Oral)   Resp 18   Ht 5' 11\" (1.803 m)   Wt 170 lb (77.1 kg)   SpO2 99%   BMI 23.71 kg/m²   CONSTITUTIONAL:  awake, alert, cooperative, no apparent distress, and appears stated age  MUSCULOSKELETAL:  Right upper Extremity:  Skin examination reveals a small area of fluctuance over the volar aspect of the pinky finger finger pad. This does not propagate proximally. There is no fusiform swelling. There is no tenderness to palpation along the flexor tendon sheath. There is no pain with passive extension about the finger. Focally tender over the fifth finger pad. Remainder right upper extremity is nontender to palpation. Sensation grossly intact in median, radial, ulnar nerve distributions to the hand. Patient reports altered sensation along the volar, radial, ulnar and dorsal aspects of the fingertip of the fifth finger. Motor function grossly intact distally in AIN, PIN, median, hard, ulnar nerve distributions to the hand. Patient able to actively flex and extend the IP joints of all fingers. Somewhat limited on the pinky finger due to pain. Unable to make concentric fist  Compartments soft and compressible  +2/4 radial pulse, brisk cap refill    Secondary Exam:   LUE: No obvious signs of trauma. -TTP to fingers, hand, wrist, forearm, elbow, humerus, shoulder or clavicle. bilateralLE: No obvious signs of trauma. -TTP to foot, ankle, leg, knee, thigh, hip.     Pelvis: -TTP, -Log roll, -Heel strike     DATA:    CBC:   Lab Results   Component Value Date/Time    WBC 8.3 09/04/2022 01:53 AM RBC 3.89 09/04/2022 01:53 AM    HGB 13.7 09/04/2022 01:53 AM    HCT 40.4 09/04/2022 01:53 AM    .9 09/04/2022 01:53 AM    MCH 35.2 09/04/2022 01:53 AM    MCHC 33.9 09/04/2022 01:53 AM    RDW 13.9 09/04/2022 01:53 AM     09/04/2022 01:53 AM    MPV 8.9 09/04/2022 01:53 AM     PT/INR:  No results found for: PROTIME, INR    Radiology Review:  XR 3 views right hand reviewed demonstrating a nonacute appearing tuft fracture of the ring finger. Otherwise no acute fracture dislocations. Volar soft tissue swelling noted over the fifth finger. Procedure:  After verbal consent was obtained, the patient's right pinky finger was anesthetized using 10 cc of 1% lidocaine via a digital block. After adequate anesthesia had been obtained, the patient's hand was prepped and draped in the typical sterile fashion, Next a small stab incision was made over the radial aspect of the fifth digit in line transversely with the axis of the digit. Gross purulence was expressed. This was cultured. All adhesions and loculations were freed with blunt dissection. This was then copiously irrigated with normal saline and cleansed with a Betadine solution. The hand was then wrapped with Xeroform, 4 x 4's, ABDs and Ace wrap patient tolerated seizure well with no change neurovascular as following procedure    IMPRESSION:  Right pinky finger felon    PLAN:  Weightbearing as tolerated right upper extremity, recommended elevation and ice therapy  Pain control per ED  Antibiotics per ED  Hand I&D as detailed above  No further acute orthopedic invention planned, patient okay for discharge, will need close outpatient follow-up. Patient structured to call the clinic on Tuesday following the holiday weekend, to be seen for repeat evaluation. Patient endorsed understanding was in agreement with the plan  All patient/family questions have been answered and patient is currently agreeable to plan.   Discuss with Attending      Electronically signed by Kenzie Short DO on 9/4/2022 at 5:08 AM

## 2022-09-04 NOTE — DISCHARGE INSTRUCTIONS
Go straight to [facility] emergency department to be seen by orthopedics, Dr. Pepito Lopez

## 2022-09-06 LAB
ANAEROBIC CULTURE: NORMAL
GRAM STAIN RESULT: ABNORMAL
ORGANISM: ABNORMAL
WOUND/ABSCESS: ABNORMAL

## 2022-09-09 LAB
BLOOD CULTURE, ROUTINE: NORMAL
CULTURE, BLOOD 2: NORMAL

## 2022-12-08 ENCOUNTER — HOSPITAL ENCOUNTER (EMERGENCY)
Age: 37
Discharge: HOME | End: 2022-12-08
Payer: MEDICAID

## 2022-12-08 ENCOUNTER — APPOINTMENT (OUTPATIENT)
Dept: CT IMAGING | Age: 37
End: 2022-12-08
Payer: MEDICAID

## 2022-12-08 VITALS
SYSTOLIC BLOOD PRESSURE: 175 MMHG | TEMPERATURE: 97.4 F | WEIGHT: 185 LBS | DIASTOLIC BLOOD PRESSURE: 99 MMHG | RESPIRATION RATE: 14 BRPM | HEART RATE: 95 BPM | BODY MASS INDEX: 25.9 KG/M2 | OXYGEN SATURATION: 100 % | HEIGHT: 71 IN

## 2022-12-08 DIAGNOSIS — S09.90XA INJURY OF HEAD, INITIAL ENCOUNTER: Primary | ICD-10-CM

## 2022-12-08 PROCEDURE — 99284 EMERGENCY DEPT VISIT MOD MDM: CPT

## 2022-12-08 PROCEDURE — 70450 CT HEAD/BRAIN W/O DYE: CPT

## 2022-12-08 NOTE — ED PROVIDER NOTES
[facility]       Department of Emergency Medicine   ED  Encounter Note  Admit Date/RoomTime: 2022  1:19 PM  ED Room: /  NAME: Amador Rodgers  : 1985  MRN: [id number]     Chief Complaint:  Head Injury (Hit in head by elevator door 3 days ago headache No ecchymosis or swelling noted)    HISTORY OF PRESENT ILLNESS        Amador Rodgers is a 40 y.o. male who presents to the ED private vehicle for head injury. Struck by an elevator door 3 days ago. No nausea vomiting. Not anticoagulated. Mild headache. No associated ecchymosis or hematoma. No sustained laceration  ROS   Pertinent positives and negatives are stated within HPI, all other systems reviewed and are negative. Past Medical History:  has no past medical history on file. Surgical History:  has no past surgical history on file. Social History:  reports that he has never smoked. He has never used smokeless tobacco. He reports current alcohol use of about 1.0 standard drink per week. He reports that he does not currently use drugs. Family History: family history is not on file. Allergies: Patient has no known allergies. PHYSICAL EXAM   Oxygen Saturation Interpretation: Normal on room air analysis. ED Triage Vitals [22 1150]   BP Temp Temp Source Heart Rate Resp SpO2 Height Weight   (!) 175/99 97.4 °F (36.3 °C) Temporal 95 14 100 % 5' 11\" (1.803 m) 185 lb (83.9 kg)         Physical Exam  General: Awake alert and oriented. Well-appearing. Nontoxic. HEENT: Normocephalic, atraumatic. Pupils equal neg  hemotympanums  Neck: Normal range of motion  Cardiac: Regular rate  Respiratory: Respirations even, unlabored.   No respiratory distress  Abdomen: Nondistended  Musculoskelatal: Moves all extremities x4  Neuro: Nonlateralizing GCS 15, steady gait  Skin: Flesh tone, warm, dry  Psych: Normal affect  Lab / Imaging Results   (All laboratory and radiology results have been personally reviewed by myself)  Labs:  No results found for this visit on 12/08/22. Imaging: All Radiology results interpreted by Radiologist unless otherwise noted. CT HEAD WO CONTRAST   Final Result   No acute intracranial abnormality. ED Course / Medical Decision Making   Medications - No data to display     Re-examination:  12/8/22       Time:   Patients condition . Consult(s):   None    Procedure(s):       MDM:   ct neg, neuro intact, given concussion precautions and instructions. He will follow-up with his PCP. Plan of Care/Counseling:  TUTU Arellano CNP reviewed today's visit with the patient in addition to providing specific details for the plan of care and counseling regarding the diagnosis and prognosis. Questions are answered at this time and are agreeable with the plan. ASSESSMENT     1. Injury of head, initial encounter      PLAN   Discharged home. Patient condition is good    New Medications     New Prescriptions    No medications on file     Electronically signed by TUTU Arellano CNP   DD: 12/8/22  **This report was transcribed using voice recognition software. Every effort was made to ensure accuracy; however, inadvertent computerized transcription errors may be present.   END OF ED PROVIDER NOTE       TUTU Arellano CNP  12/08/22 [id number]

## 2022-12-19 ENCOUNTER — HOSPITAL ENCOUNTER (EMERGENCY)
Age: 37
Discharge: HOME | End: 2022-12-19
Payer: MEDICAID

## 2022-12-19 VITALS
SYSTOLIC BLOOD PRESSURE: 140 MMHG | HEART RATE: 96 BPM | TEMPERATURE: 97.8 F | RESPIRATION RATE: 22 BRPM | DIASTOLIC BLOOD PRESSURE: 88 MMHG | OXYGEN SATURATION: 96 %

## 2022-12-19 DIAGNOSIS — Z20.2 POSSIBLE EXPOSURE TO STD: Primary | ICD-10-CM

## 2022-12-19 LAB
BACTERIA: NORMAL /HPF
BILIRUBIN URINE: NEGATIVE
BLOOD, URINE: NORMAL
CLARITY: CLEAR
COLOR: YELLOW
GLUCOSE URINE: NEGATIVE MG/DL
KETONES, URINE: NEGATIVE MG/DL
LEUKOCYTE ESTERASE, URINE: NEGATIVE
NITRITE, URINE: NEGATIVE
PH UA: 5.5 (ref 5–9)
PROTEIN UA: NEGATIVE MG/DL
RBC UA: NORMAL /HPF (ref 0–2)
SPECIFIC GRAVITY UA: 1.01 (ref 1–1.03)
UROBILINOGEN, URINE: 0.2 E.U./DL
WBC UA: NORMAL /HPF (ref 0–5)

## 2022-12-19 PROCEDURE — 6370000000 HC RX 637 (ALT 250 FOR IP): Performed by: PHYSICIAN ASSISTANT

## 2022-12-19 PROCEDURE — 6360000002 HC RX W HCPCS: Performed by: PHYSICIAN ASSISTANT

## 2022-12-19 PROCEDURE — 87591 N.GONORRHOEAE DNA AMP PROB: CPT

## 2022-12-19 PROCEDURE — 96372 THER/PROPH/DIAG INJ SC/IM: CPT

## 2022-12-19 PROCEDURE — 87491 CHLMYD TRACH DNA AMP PROBE: CPT

## 2022-12-19 PROCEDURE — 2500000003 HC RX 250 WO HCPCS: Performed by: PHYSICIAN ASSISTANT

## 2022-12-19 PROCEDURE — 81001 URINALYSIS AUTO W/SCOPE: CPT

## 2022-12-19 PROCEDURE — 99284 EMERGENCY DEPT VISIT MOD MDM: CPT

## 2022-12-19 RX ORDER — METRONIDAZOLE 500 MG/1
2000 TABLET ORAL ONCE
Status: COMPLETED | OUTPATIENT
Start: 2022-12-19 | End: 2022-12-19

## 2022-12-19 RX ORDER — AZITHROMYCIN 250 MG/1
1000 TABLET, FILM COATED ORAL ONCE
Status: COMPLETED | OUTPATIENT
Start: 2022-12-19 | End: 2022-12-19

## 2022-12-19 RX ORDER — ONDANSETRON 4 MG/1
4 TABLET, ORALLY DISINTEGRATING ORAL ONCE
Status: COMPLETED | OUTPATIENT
Start: 2022-12-19 | End: 2022-12-19

## 2022-12-19 RX ADMIN — METRONIDAZOLE 2000 MG: 500 TABLET ORAL at 14:08

## 2022-12-19 RX ADMIN — ONDANSETRON 4 MG: 4 TABLET, ORALLY DISINTEGRATING ORAL at 14:08

## 2022-12-19 RX ADMIN — LIDOCAINE HYDROCHLORIDE 500 MG: 10 INJECTION, SOLUTION EPIDURAL; INFILTRATION; INTRACAUDAL; PERINEURAL at 14:09

## 2022-12-19 RX ADMIN — AZITHROMYCIN MONOHYDRATE 1000 MG: 250 TABLET ORAL at 14:08

## 2022-12-19 NOTE — DISCHARGE INSTRUCTIONS
Please look on your MyChart for the results which will populate in 5 to 7 days. Please practice safer sex practices. Please wear a condom. Please follow-up for test of cure in 1 month. This will be done at your family doctor's office.

## 2022-12-19 NOTE — Clinical Note
Hector Hooper was seen and treated in our emergency department on 12/19/2022. He may return to work on 12/20/2022. If you have any questions or concerns, please don't hesitate to call.       Cruz De La Rosa PA-C

## 2022-12-19 NOTE — ED PROVIDER NOTES
[id number] [facility]  Department of Emergency Medicine   ED  Encounter Note  Admit Date/RoomTime: 2022  1:28 PM  ED Room: [place] [address]    NAME: Brigitte De Los Santos  : 1985  MRN: [id number]     Chief Complaint:  Exposure to STD    History of Present Illness      Brigitte De Los Santos is a 40 y.o. old male who presents to the emergency department by private vehicle, for known exposure to Trichomonas with no present symptoms. Since exposure/onset there has been no developing symptoms. He denies any genital discharge, genital rash, genital ulcers, scrotal mass, scrotal pain, fever, abdominal pain, back pain, dysuria, urinary frequency, or hematuria. Patient has no radiation of pain. No pain or radiation of any pain. Nothing seems to make anything better or worse. His prior STD history: Gonorrhea. ROS   Pertinent positives and negatives are stated within HPI, all other systems reviewed and are negative. Past Medical History:  has no past medical history on file. Surgical History:  has no past surgical history on file. Social History:  reports that he has never smoked. He has never used smokeless tobacco. He reports current alcohol use of about 1.0 standard drink per week. He reports that he does not currently use drugs. Family History: family history is not on file. Allergies: Patient has no known allergies. Physical Exam   Oxygen Saturation Interpretation: Normal.        ED Triage Vitals [22 1302]   BP Temp Temp Source Heart Rate Resp SpO2 Height Weight   -- 97.8 °F (36.6 °C) Temporal (!) 122 -- 98 % -- --         Constitutional:  Alert, development consistent with age. HEENT:  NC/NT. Airway patent  Neck:  Normal ROM. Supple. Respiratory:  Lungs Clear to auscultation and breath sounds equal.  CV:  Regular rate and rhythm, normal heart sounds, without pathological murmurs, ectopy, gallops, or rubs. GI:  AbdomenSoft, nontender, good bowel sounds.   No firm or pulsatile mass. : Genital / Rectal examination deferred or declined by patient  Integument:  Normal turgor. Warm, dry, without visible rash, unless noted elsewhere. Lymphatics: No lymphangitis or adenopathy noted. Neurological:  Orientation age-appropriate. Motor functions intact. Lab / Imaging Results   (All laboratory and radiology results have been personally reviewed by myself)  Labs:  Results for orders placed or performed during the hospital encounter of 12/19/22   C.trachomatis N.gonorrhoeae DNA, Urine    Specimen: Urine   Result Value Ref Range    Source Urine    Urinalysis with Microscopic   Result Value Ref Range    Color, UA Yellow Straw/Yellow    Clarity, UA Clear Clear    Glucose, Ur Negative Negative mg/dL    Bilirubin Urine Negative Negative    Ketones, Urine Negative Negative mg/dL    Specific Gravity, UA 1.015 1.005 - 1.030    Blood, Urine TRACE-LYSED Negative    pH, UA 5.5 5.0 - 9.0    Protein, UA Negative Negative mg/dL    Urobilinogen, Urine 0.2 <2.0 E.U./dL    Nitrite, Urine Negative Negative    Leukocyte Esterase, Urine Negative Negative     Imaging: All Radiology results interpreted by Radiologist unless otherwise noted. No orders to display     ED Course / Medical Decision Making     Medications   cefTRIAXone (ROCEPHIN) 500 mg in lidocaine 1 % 1.43 mL IM Injection (500 mg IntraMUSCular Given 12/19/22 1409)   azithromycin (ZITHROMAX) tablet 1,000 mg (1,000 mg Oral Given 12/19/22 1408)   ondansetron (ZOFRAN-ODT) disintegrating tablet 4 mg (4 mg Oral Given 12/19/22 1408)   metroNIDAZOLE (FLAGYL) tablet 2,000 mg (2,000 mg Oral Given 12/19/22 1408)        Consult(s):   None    Procedure(s):   None    Medical Decision Making:    Patient is a 29-year-old that states that he got a call from his significant other that she was exposed to trichomonas and he needs to be treated. Patient states he has no current pain or radiation of any pain.   Patient states he has no other symptoms whatsoever. Patient has no urinary symptoms such as urgency, frequency, burning. Patient has no genital rashes ulcers or lesions. Patient declined examination today. Patient was advised of safer sex practices. Plan to obtain cultures and treat for suspected STD and provide outpatient follow-up instructions. Patient was encouraged to wear a condom. Patient will be treated today with Rocephin 5 mg IM, azithromycin 1000 mg orally, 4 mg of Zofran orally as well as Flagyl 2000 mg orally. Patient maintained on medication was educated no intercourse for 2 weeks. Patient was educated that a urinalysis was done and sent off and results will be our on MyChart. Patient was advised that he must get a test of cure in 1 month. Patient was advised if any worsening symptoms always return back to emergency department. Patient voiced understanding and agreed. Patient was explicitly instructed on specific signs and symptoms on which to return to the emergency room for. Patient was instructed to return to the ER for any new or worsening symptoms. Additional discharge instructions were given verbally. All questions were answered. Patient is comfortable and agreeable with discharge plan. Patient in no acute distress and non-toxic in appearance. Plan of Care/Counseling:  Nathan Castaneda PA-C reviewed today's visit with the patient in addition to providing specific details for the plan of care and counseling regarding the diagnosis and prognosis. Questions are answered at this time and are agreeable with the plan. Assessment     1. Possible exposure to STD      Plan   Discharged home. Patient condition is stable    New Medications     New Prescriptions    No medications on file     Electronically signed by Nathan Castaneda PA-C   DD: 12/19/22  **This report was transcribed using voice recognition software.  Every effort was made to ensure accuracy; however, inadvertent computerized transcription errors may be present.   END OF ED PROVIDER NOTE      Seble Baez PA-C  12/19/22 [id number]

## 2022-12-22 LAB
C. TRACHOMATIS DNA ,URINE: NEGATIVE
N. GONORRHOEAE DNA, URINE: NEGATIVE
SOURCE: NORMAL

## 2024-03-13 ENCOUNTER — HOSPITAL ENCOUNTER (EMERGENCY)
Age: 39
Discharge: HOME | End: 2024-03-13
Payer: COMMERCIAL

## 2024-03-13 VITALS
TEMPERATURE: 98.8 F | OXYGEN SATURATION: 99 % | WEIGHT: 185 LBS | RESPIRATION RATE: 16 BRPM | HEART RATE: 95 BPM | SYSTOLIC BLOOD PRESSURE: 173 MMHG | BODY MASS INDEX: 25.8 KG/M2 | DIASTOLIC BLOOD PRESSURE: 123 MMHG

## 2024-03-13 DIAGNOSIS — R22.0 FACIAL SWELLING: Primary | ICD-10-CM

## 2024-03-13 DIAGNOSIS — R59.0 CERVICAL LYMPHADENOPATHY: ICD-10-CM

## 2024-03-13 DIAGNOSIS — J02.9 ACUTE PHARYNGITIS, UNSPECIFIED ETIOLOGY: ICD-10-CM

## 2024-03-13 DIAGNOSIS — K08.89 PAIN, DENTAL: ICD-10-CM

## 2024-03-13 LAB
HETEROPH AB BLD QL IA: NEGATIVE
INFLUENZA A BY PCR: NOT DETECTED
INFLUENZA B BY PCR: NOT DETECTED
SPECIMEN SOURCE: NORMAL
STREP A, MOLECULAR: NEGATIVE

## 2024-03-13 PROCEDURE — 6360000002 HC RX W HCPCS: Performed by: PHYSICIAN ASSISTANT

## 2024-03-13 PROCEDURE — 6370000000 HC RX 637 (ALT 250 FOR IP): Performed by: PHYSICIAN ASSISTANT

## 2024-03-13 PROCEDURE — 87502 INFLUENZA DNA AMP PROBE: CPT

## 2024-03-13 PROCEDURE — 99284 EMERGENCY DEPT VISIT MOD MDM: CPT

## 2024-03-13 PROCEDURE — 96372 THER/PROPH/DIAG INJ SC/IM: CPT

## 2024-03-13 PROCEDURE — 86308 HETEROPHILE ANTIBODY SCREEN: CPT

## 2024-03-13 PROCEDURE — 87651 STREP A DNA AMP PROBE: CPT

## 2024-03-13 RX ORDER — AMOXICILLIN AND CLAVULANATE POTASSIUM 875; 125 MG/1; MG/1
1 TABLET, FILM COATED ORAL 2 TIMES DAILY
Qty: 14 TABLET | Refills: 0 | Status: SHIPPED | OUTPATIENT
Start: 2024-03-13 | End: 2024-03-20

## 2024-03-13 RX ORDER — KETOROLAC TROMETHAMINE 30 MG/ML
30 INJECTION, SOLUTION INTRAMUSCULAR; INTRAVENOUS ONCE
Status: COMPLETED | OUTPATIENT
Start: 2024-03-13 | End: 2024-03-13

## 2024-03-13 RX ORDER — DEXAMETHASONE SODIUM PHOSPHATE 10 MG/ML
10 INJECTION INTRAMUSCULAR; INTRAVENOUS ONCE
Status: COMPLETED | OUTPATIENT
Start: 2024-03-13 | End: 2024-03-13

## 2024-03-13 RX ORDER — NAPROXEN 500 MG/1
500 TABLET ORAL 2 TIMES DAILY
Qty: 30 TABLET | Refills: 0 | Status: SHIPPED | OUTPATIENT
Start: 2024-03-13

## 2024-03-13 RX ORDER — AMOXICILLIN AND CLAVULANATE POTASSIUM 875; 125 MG/1; MG/1
1 TABLET, FILM COATED ORAL ONCE
Status: COMPLETED | OUTPATIENT
Start: 2024-03-13 | End: 2024-03-13

## 2024-03-13 RX ORDER — METHYLPREDNISOLONE 4 MG/1
TABLET ORAL
Qty: 1 KIT | Refills: 0 | Status: SHIPPED | OUTPATIENT
Start: 2024-03-13

## 2024-03-13 RX ADMIN — AMOXICILLIN AND CLAVULANATE POTASSIUM 1 TABLET: 875; 125 TABLET, FILM COATED ORAL at 12:22

## 2024-03-13 RX ADMIN — DEXAMETHASONE SODIUM PHOSPHATE 10 MG: 10 INJECTION INTRAMUSCULAR; INTRAVENOUS at 12:22

## 2024-03-13 RX ADMIN — KETOROLAC TROMETHAMINE 30 MG: 30 INJECTION, SOLUTION INTRAMUSCULAR at 12:22

## 2024-03-13 NOTE — ED PROVIDER NOTES
by the EMR. We also discussed medications that were prescribed (if any) including common side effects and interactions. All questions were addressed.  They understand return precautions and discharge instructions. The patient and/or family/friend/caregiver expressed understanding. Vitals were stable and they were in no distress at discharge. Findings were discussed with the patient and reasons to immediately return to the ED were articulated to them.     I used an evidence-based tool along with my training and experience to weigh the risk of discharge against the risks of further testing, imaging, or hospitalization. At this time, I estimate the risks of additional testing, imaging, or hospitalization to be equal to or greater than the risk of discharge.  I discussed my risk assessment with the patient and the patient consents to the risk of discharge as well as the risk of uncertainty in estimating outcomes. At this time, the risk of acute decompensation with death before the patient can return for re-evaluation is most likely lower than the risk of death attributable to being in the hospital.    Patient will follow-up with their PMD    DISPOSITION CONSIDERATIONS:    Disposition Considerations:  (include Tests not done, Shared Decision Making, Pt Expectation of Test or Tx.):   Appropriate for outpatient management        Social Determinants:  Social Determinants : None    MEDICATIONS:   DISCHARGE MEDICATIONS:  New Prescriptions    AMOXICILLIN-CLAVULANATE (AUGMENTIN) 875-125 MG PER TABLET    Take 1 tablet by mouth 2 times daily for 7 days    METHYLPREDNISOLONE (MEDROL, CHICO,) 4 MG TABLET    Take by mouth.    NAPROXEN (NAPROSYN) 500 MG TABLET    Take 1 tablet by mouth 2 times daily     DISCONTINUED MEDICATIONS:  Discontinued Medications    NAPROXEN (NAPROSYN) 500 MG TABLET    Take 1 tablet by mouth 2 times daily as needed for Pain     DIAGNOSIS:     1. Facial swelling    2. Acute pharyngitis, unspecified etiology